# Patient Record
Sex: MALE | Race: WHITE | NOT HISPANIC OR LATINO | Employment: FULL TIME | URBAN - METROPOLITAN AREA
[De-identification: names, ages, dates, MRNs, and addresses within clinical notes are randomized per-mention and may not be internally consistent; named-entity substitution may affect disease eponyms.]

---

## 2017-06-25 ENCOUNTER — HOSPITAL ENCOUNTER (EMERGENCY)
Facility: HOSPITAL | Age: 22
Discharge: HOME | End: 2017-06-25
Attending: EMERGENCY MEDICINE | Admitting: EMERGENCY MEDICINE

## 2017-06-25 VITALS
RESPIRATION RATE: 16 BRPM | SYSTOLIC BLOOD PRESSURE: 118 MMHG | TEMPERATURE: 98.2 F | HEART RATE: 80 BPM | DIASTOLIC BLOOD PRESSURE: 77 MMHG | OXYGEN SATURATION: 98 % | BODY MASS INDEX: 41.54 KG/M2 | WEIGHT: 220 LBS | HEIGHT: 61 IN

## 2017-06-25 DIAGNOSIS — F41.1 GENERALIZED ANXIETY DISORDER: Primary | ICD-10-CM

## 2017-06-25 LAB
AMPHET+METHAMPHET UR QL: NEGATIVE
BARBITURATES UR QL SCN: NEGATIVE
BENZODIAZ UR QL SCN: NEGATIVE
CANNABINOIDS SERPL QL: NEGATIVE
COCAINE UR QL: NEGATIVE
ETHANOL BLD-MCNC: <10 MG/DL (ref 0–10)
ETHANOL UR QL: <0.01 %
METHADONE UR QL SCN: NEGATIVE
OPIATES UR QL: NEGATIVE
OXYCODONE UR QL SCN: NEGATIVE

## 2017-06-25 PROCEDURE — 80307 DRUG TEST PRSMV CHEM ANLYZR: CPT | Performed by: PHYSICIAN ASSISTANT

## 2017-06-25 PROCEDURE — 99284 EMERGENCY DEPT VISIT MOD MDM: CPT

## 2017-06-25 PROCEDURE — 90791 PSYCH DIAGNOSTIC EVALUATION: CPT
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
## 2017-06-25 NOTE — ED PROVIDER NOTES
I supervised care provided by the midlevel provider.    We have discussed this patient's history, physical exam, and treatment plan.   I have reviewed the note and personally saw and examined the patient and agree with the plan of care.        Pt presents to the ED c/o passive suicidal ideations. Pt is unclear of when his sx began, but reports that he has had recent stressful life events. Pt reports that he does not have a plan currently    Pt sleeping comfortably but rouses to awake, alert  Lungs good air movement bilat, CV nontachycardic  abd soft, NT      Pt's friend reports that pt has a gun in his home, but pt states that he will be living in a place where there is no gun in the vicinity, agrees to move gun out of his environment if need be as he states he discussed with [facility] staff.  [facility] has evaluated pt and has cleared pt for discharge. Pt has been provided with referrals for IOP.             Documentation assistance provided by Agata Espinal. Information recorded by the scribe was done at my direction and has been verified and validated by me.     Entered by Agata Espinal, acting as scribe for Dr. Yazmin MD.        Agata Espinal  06/25/17 3276       Kiarra Arce MD  06/26/17 6083

## 2017-06-25 NOTE — DISCHARGE INSTRUCTIONS
PLEASE READ AND REVIEW ALL DISCHARGE INSTRUCTIONS.     Please follow up with your primary care physician for any further evaluation/treatment and further management of your blood pressure.     Recheck in emergency department for any worsening and/or concerning symptoms.     Take all prescribed medicine as written and continue chronic medication.    Follow up with Intensive Outpatient Program this week.
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
## 2017-06-27 ENCOUNTER — APPOINTMENT (OUTPATIENT)
Dept: PSYCHIATRY | Facility: HOSPITAL | Age: 22
End: 2017-06-27

## 2017-06-27 ENCOUNTER — OFFICE VISIT (OUTPATIENT)
Dept: PSYCHIATRY | Facility: HOSPITAL | Age: 22
End: 2017-06-27

## 2017-06-27 DIAGNOSIS — F33.1 MODERATE EPISODE OF RECURRENT MAJOR DEPRESSIVE DISORDER (HCC): Primary | ICD-10-CM

## 2017-06-27 PROCEDURE — H0035 MH PARTIAL HOSP TX UNDER 24H: HCPCS | Performed by: COUNSELOR

## 2017-06-27 PROCEDURE — S9480 INTENSIVE OUTPATIENT PSYCHIA: HCPCS | Performed by: COUNSELOR

## 2017-06-27 NOTE — PROGRESS NOTES
IOP  Group note [id number]  Observations:    Engaged in Activity / Process and Self -disclosed: Yes  Applies Topic to self: Yes  Able to give Constructive Feedback: No  Affect: anxious  Degree of Insightful Thinking 5  Notes:  New to the group.  Reported no previous mental health treatment.  Dealing with separation from wife six months and recently gotten back together.  Additional stressor over the weekend: pt learned got another woman pregnant during the separation with wife.   Pt very anxious and had some suicidal thoughts.   None today.      Vicenta Dorsey, [facility]

## 2017-06-27 NOTE — PROGRESS NOTES
Other     Information/activity     12 Principles of Wholeness    Instructor Ramez Tinsley     1:27 PM     Patient Response Good participation

## 2017-06-27 NOTE — PROGRESS NOTES
Wrap-up  Day 1/ PHP  Dealing with depression and anxiety  Mood at 6  with 10 being the worse    Feeling sad or empty   Trouble with concentration, memory, or making decisions  Fatigue or loss of energy  Loss of interest in things you usually like to do  Increased Irritabliliy  Easily distracted  An increase/decrease in normal appetite  Changes in normal sleep patterns (increase, decrease, or broken hoours of sleep)  Feelings of worthlessness  Feelings of increased guilt  Increased nervous feelings/anxiety  Racing thoughts  NO SI  Quiet until was asked direct questions  Goals for later today: try to stay calm with mindfulness and read  Left early today for an MD appt.  No afternoon programming was attended  To return to [facility] on 6/28

## 2017-06-28 ENCOUNTER — APPOINTMENT (OUTPATIENT)
Dept: PSYCHIATRY | Facility: HOSPITAL | Age: 22
End: 2017-06-28

## 2017-06-28 ENCOUNTER — OFFICE VISIT (OUTPATIENT)
Dept: PSYCHIATRY | Facility: HOSPITAL | Age: 22
End: 2017-06-28

## 2017-06-28 DIAGNOSIS — F33.1 MODERATE EPISODE OF RECURRENT MAJOR DEPRESSIVE DISORDER (HCC): Primary | ICD-10-CM

## 2017-06-28 PROCEDURE — H0035 MH PARTIAL HOSP TX UNDER 24H: HCPCS | Performed by: COUNSELOR

## 2017-06-28 NOTE — PLAN OF CARE
Problem:  Patient Care Overview (Adult)  Goal: Interdisciplinary Rounds/Family Conference  Tx. Team met to review treatment plan.   Pt was direct admit to PHP on 6/27 dealing with depression and anxiety.   Stressors include a separation from wife and news got another woman pregnant during the separation.  Trying to repair marriage with wife.   NO SI.   Focus has been on expression of thoughts and feelings and practice of relaxation skills.  Pt has no previous psych treatment.   Pt given a list of providers for aftercare.

## 2017-06-28 NOTE — PROGRESS NOTES
PHP  Group note [id number]  Observations:    Engaged in Activity / Process and Self -disclosed: Yes  Applies Topic to self: Yes  Able to give Constructive Feedback: Yes  Affect: anxious  Degree of Insightful Thinking 6  Notes:  Shared more of hx of anxiety and of loss of dreams to attend college d/t needing to get  when girlfriend was pregnant.      Vicenta Dorsey, [facility]

## 2017-06-28 NOTE — PROGRESS NOTES
Other     Information/activity     12:   Expressive Therapy exercise using symbols for introductions    Instructor Vicenta Dorsey, [facility]     12:40 PM     Patient Response Good participation, creative expression with the use of symbols today

## 2017-06-28 NOTE — PROGRESS NOTES
IOP  Group note   Observations:    Engaged in Activity / Process and Self -disclosed: Yes  Applies Topic to self: Yes  Able to give Constructive Feedback: No  Affect: anxious  Degree of Insightful Thinking 5  Notes:  Shared of going to stress test for cardiac condition yesterday.  Received news of no major problems but pt's medication was increased.  Pt shared of family hx of cardiac pxs. And was told even with meds, may pass out from time to time thus causing some increased anxiety.  Reported a positive afternoon interacting with wife and son.   No SI.      Vicenta Dorsey, [facility]

## 2017-06-28 NOTE — PROGRESS NOTES
Wrap-up  Day 2/ PHP  Mood at 4  with 10 being the worst  Decreased appetite  Guilt  Decreased concentration  Racing thoughts  Easily distracted  No SI  More talkative in smaller groups  Goals for later today:  More time with mindfulness activity, read for pleasure and take wife out to dinner  Returns to PHP on 6/29.

## 2017-06-28 NOTE — PROGRESS NOTES
Other     IOP Class 10:30AM - 11:20AM / [facility]     Instructor ANTONIO Salas     11:19 AM     Patient Response Quiet

## 2017-06-29 ENCOUNTER — APPOINTMENT (OUTPATIENT)
Dept: PSYCHIATRY | Facility: HOSPITAL | Age: 22
End: 2017-06-29

## 2017-06-29 ENCOUNTER — OFFICE VISIT (OUTPATIENT)
Dept: PSYCHIATRY | Facility: HOSPITAL | Age: 22
End: 2017-06-29

## 2017-06-29 DIAGNOSIS — F33.2 SEVERE EPISODE OF RECURRENT MAJOR DEPRESSIVE DISORDER, WITHOUT PSYCHOTIC FEATURES (HCC): Primary | ICD-10-CM

## 2017-06-29 PROCEDURE — H0035 MH PARTIAL HOSP TX UNDER 24H: HCPCS | Performed by: COUNSELOR

## 2017-06-29 NOTE — PROGRESS NOTES
[id number] Psych IOP class     Class topic: Mindfulness     Handouts: 13 ways to practice mindfulness and [employer] [facility] by [employer]    Class participation: good

## 2017-06-29 NOTE — PROGRESS NOTES
Wrap-up  Day 3/8 PHP  Mood at 5  with 10 being the worse    Trouble with concentration, memory, or making decisions  Fatigue or loss of energy  Loss of interest in things you usually like to do  Easily tearing up/crying  Easily distracted  An increase/decrease in normal appetite  Changes in normal sleep patterns (increase, decrease, or broken hoours of sleep)  Feelings of increased guilt  Increased nervous feelings/anxiety  Racing thoughts  NO SI  Good participation in classes and groups  Saw Dr. Manley  Goals for later today: practice mindful techniques, read and clean room in the house

## 2017-06-29 NOTE — PROGRESS NOTES
PHP  Group note [id number]  Observations:    Engaged in Activity / Process and Self -disclosed: Yes  Applies Topic to self: Yes  Able to give Constructive Feedback: No  Affect: sad  Degree of Insightful Thinking 5  Notes:  Quiet until was asked direct questions.   Attentive as peers shared.  Reported pt and wife went out to a restaurant/bar with wife's coworkers and pt became very anxious-described just shutting down.  Identified some positive interaction with son yesterday.  NO SI reported.  Left group early to meet with Dr. Manley.      Vicenta Dorsey, [facility]

## 2017-06-29 NOTE — PROGRESS NOTES
[id number] [facility] class     Class topic: Cognitive distortions     Handouts & video: 10 forms of twisted thinking and ways to untwist your thinking; WILMAR talk with Froy Blakely     Class participation: good

## 2017-06-29 NOTE — PROGRESS NOTES
Other     Information/activity     [id number] Discussion on Coping with Negativity    Instructor Vicenta Dorsey, [facility]     2:21 PM     Patient Response Good participation, applied material to own situation

## 2017-06-29 NOTE — PROGRESS NOTES
"Josué Ray  22 y.o.  [facility], PSYch  Admitted : 06/27/17  Eval date; 06/29/2017  Attending: Dr. Gilbert Holder, Dr. Odilon Manley covering    HPI:  23 yo wm admitted to [facility] for issues with depression and anxiety.  Pt has been noting worsening mood issues over the past several months, starting with the separation between himself and his wife last September.  Since then, they have only continued to worsen and he has been increasingly drinking alcohol to aid.  He has since slowed it to limited if social use.  No other drugs of abuse noted.  Pt sought help now due to the worsening of the mood and developing thoughts of SI that were worsening.  He admitted to these for several weeks, at least, but no desire or clear plan.  Pt able to cite his shawna and family as reasons to not harm himself.   Pt still very anxious and depressed, with c/o limited sleep. Pt responsive to supportive therapy and direction about better stress management directions.  Pt goal and future oriented with plans to improve his situation.    Past Medical History:   Diagnosis Date   • Anxiety    • Prolonged QT interval syndrome     \"Borderline\" with family hx of sudden cardiac death     Past Psychiatric History:   No overt tx before.  No inpt admissions or hx/o mood issues.  CD issues as noted.    Social History     Social History   • Marital status:      Spouse name: N/A   • Number of children: N/A   • Years of education: N/A     Occupational History   • Not on file.     Social History Main Topics   • Smoking status: Current Every Day Smoker     Packs/day: 0.25     Types: Cigarettes   • Smokeless tobacco: Not on file   • Alcohol use Yes      Comment: a few beers occasionally in a social situation   • Drug use: No   • Sexual activity: Defer     Other Topics Concern   • Not on file     Social History Narrative   • No narrative on file     No family history on file.    Objective     There were no vitals filed for this visit.    Lab Results " (last 72 hours)     ** No results found for the last 72 hours. **            Current Outpatient Prescriptions:   •  METOPROLOL TARTRATE PO, Take 25 mg by mouth 2 (Two) Times a Day., Disp: , Rfl:     Mental Status exam:    Appearance: alert, mod-well groomed, wm.  Concentration/Focus:  Mod to fair  Behaviors:  Psychomotor agitation seen  Speech:  clear  Mood :  Anxious, depressed  Affect:  labile  Thought process:  org  Thought Content:  No avh  Memory :  fair  Associations:  wnl  Cognitive function:  intact  Alert and oriented :  X 3  Suicidal Thoughts:  None now  Homicidal Thoughts:  none  Insight/Judgement:  limited    Assessment     Diagnostic impression:  1.  MDD, rec, severe without psychotic fx's  2.  ARNALDO  3. Alcohol abuse    Recommendations:  1. Continue with PHP for pt well being and stabilization.  2. 25 mg of zoloft for mood, with instruction for pt to inform his cardiologist of the change.  3. Continue with home meds and f/u as directed.  4. Dr. Holder to follow further in the program.

## 2017-06-30 ENCOUNTER — APPOINTMENT (OUTPATIENT)
Dept: PSYCHIATRY | Facility: HOSPITAL | Age: 22
End: 2017-06-30

## 2017-06-30 ENCOUNTER — OFFICE VISIT (OUTPATIENT)
Dept: PSYCHIATRY | Facility: HOSPITAL | Age: 22
End: 2017-06-30

## 2017-06-30 DIAGNOSIS — F33.2 SEVERE EPISODE OF RECURRENT MAJOR DEPRESSIVE DISORDER, WITHOUT PSYCHOTIC FEATURES (HCC): Primary | ICD-10-CM

## 2017-06-30 PROCEDURE — H0035 MH PARTIAL HOSP TX UNDER 24H: HCPCS | Performed by: COUNSELOR

## 2017-06-30 NOTE — PROGRESS NOTES
PHP/IOP  Group note [id number]  Observations:    Engaged in Activity / Process and Self -disclosed: Yes  Applies Topic to self: Yes  Able to give Constructive Feedback: No  Affect: sad  Degree of Insightful Thinking 5  Notes:  Shared of some difficult situation where anxiety escalated.  Pt able to practice deep breathing and some time slowing down thoughts which were helpful.  No SI.  Reported wife is trying to be supportive.      Vicenta Dorsey, [facility]

## 2017-06-30 NOTE — PROGRESS NOTES
[id number] [facility] class     Class topic: Letter to self     Class participation: good

## 2017-06-30 NOTE — PROGRESS NOTES
PHP  Group note [id number]  Observations:    Engaged in Activity / Process and Self -disclosed: Yes  Applies Topic to self: Yes  Able to give Constructive Feedback: Yes  Affect: bright  Degree of Insightful Thinking 7  Notes: Group focused on self-care goals---specifically, pt identified love of hx, reading about various wars and playing video games.   Plans on making time for these this weekend.  No JEN Dorsey, [facility]

## 2017-06-30 NOTE — PROGRESS NOTES
[id number] Psych IOP class     Class topic: Mindfulness; including WILMAR talk and guided meditation     Class participation: good

## 2017-07-03 ENCOUNTER — APPOINTMENT (OUTPATIENT)
Dept: PSYCHIATRY | Facility: HOSPITAL | Age: 22
End: 2017-07-03

## 2017-07-05 ENCOUNTER — APPOINTMENT (OUTPATIENT)
Dept: PSYCHIATRY | Facility: HOSPITAL | Age: 22
End: 2017-07-05

## 2017-07-06 ENCOUNTER — APPOINTMENT (OUTPATIENT)
Dept: PSYCHIATRY | Facility: HOSPITAL | Age: 22
End: 2017-07-06

## 2017-07-07 ENCOUNTER — APPOINTMENT (OUTPATIENT)
Dept: PSYCHIATRY | Facility: HOSPITAL | Age: 22
End: 2017-07-07

## 2017-07-10 ENCOUNTER — APPOINTMENT (OUTPATIENT)
Dept: PSYCHIATRY | Facility: HOSPITAL | Age: 22
End: 2017-07-10

## 2017-07-11 ENCOUNTER — APPOINTMENT (OUTPATIENT)
Dept: PSYCHIATRY | Facility: HOSPITAL | Age: 22
End: 2017-07-11

## 2017-07-12 ENCOUNTER — APPOINTMENT (OUTPATIENT)
Dept: PSYCHIATRY | Facility: HOSPITAL | Age: 22
End: 2017-07-12

## 2017-07-13 ENCOUNTER — APPOINTMENT (OUTPATIENT)
Dept: PSYCHIATRY | Facility: HOSPITAL | Age: 22
End: 2017-07-13

## 2017-07-14 ENCOUNTER — APPOINTMENT (OUTPATIENT)
Dept: PSYCHIATRY | Facility: HOSPITAL | Age: 22
End: 2017-07-14

## 2017-07-17 ENCOUNTER — APPOINTMENT (OUTPATIENT)
Dept: PSYCHIATRY | Facility: HOSPITAL | Age: 22
End: 2017-07-17

## 2017-07-18 ENCOUNTER — APPOINTMENT (OUTPATIENT)
Dept: PSYCHIATRY | Facility: HOSPITAL | Age: 22
End: 2017-07-18

## 2017-07-19 ENCOUNTER — APPOINTMENT (OUTPATIENT)
Dept: PSYCHIATRY | Facility: HOSPITAL | Age: 22
End: 2017-07-19

## 2017-07-20 ENCOUNTER — APPOINTMENT (OUTPATIENT)
Dept: PSYCHIATRY | Facility: HOSPITAL | Age: 22
End: 2017-07-20

## 2023-12-05 ENCOUNTER — HOSPITAL ENCOUNTER (EMERGENCY)
Facility: HOSPITAL | Age: 28
Discharge: LEFT BEFORE DISCHARGE | End: 2023-12-05
Attending: EMERGENCY MEDICINE
Payer: COMMERCIAL

## 2023-12-05 VITALS
HEIGHT: 71 IN | TEMPERATURE: 98.3 F | SYSTOLIC BLOOD PRESSURE: 161 MMHG | BODY MASS INDEX: 28 KG/M2 | WEIGHT: 200 LBS | HEART RATE: 75 BPM | RESPIRATION RATE: 18 BRPM | OXYGEN SATURATION: 100 % | DIASTOLIC BLOOD PRESSURE: 90 MMHG

## 2023-12-05 PROCEDURE — 99211 OFF/OP EST MAY X REQ PHY/QHP: CPT | Performed by: EMERGENCY MEDICINE

## 2023-12-24 NOTE — FSED PROVIDER NOTE
"Subjective   History of Present Illness    Review of Systems    Past Medical History:   Diagnosis Date    Anxiety     Prolonged QT interval syndrome     \"Borderline\" with family hx of sudden cardiac death       No Known Allergies    Past Surgical History:   Procedure Laterality Date    NO PAST SURGERIES         History reviewed. No pertinent family history.    Social History     Socioeconomic History    Marital status:    Tobacco Use    Smoking status: Every Day     Packs/day: .25     Types: Cigarettes   Substance and Sexual Activity    Alcohol use: Yes     Comment: a few beers occasionally in a social situation    Drug use: No    Sexual activity: Defer           Objective   Physical Exam    Procedures           ED Course                                           Medical Decision Making    Patient left after triage and was never evaluated by myself  Final diagnoses:   None       ED Disposition  ED Disposition       ED Disposition   LWBS after Triage    Condition   --    Comment   --               No follow-up provider specified.       Medication List      No changes were made to your prescriptions during this visit.         "